# Patient Record
Sex: FEMALE
[De-identification: names, ages, dates, MRNs, and addresses within clinical notes are randomized per-mention and may not be internally consistent; named-entity substitution may affect disease eponyms.]

---

## 2018-06-28 ENCOUNTER — HOSPITAL ENCOUNTER (OUTPATIENT)
Dept: HOSPITAL 92 - SCSULT | Age: 38
Discharge: HOME | End: 2018-06-28
Attending: FAMILY MEDICINE
Payer: COMMERCIAL

## 2018-06-28 DIAGNOSIS — M79.605: Primary | ICD-10-CM

## 2018-06-28 NOTE — ULT
LEFT LOWER EXTREMITY VENOUS ULTRASOUND:

 

COMPARISON: 

None.

 

HISTORY: 

Left lower extremity pain for 3 days, particularly in the posterior thigh and popliteal region.

 

TECHNIQUE: 

Multiplanar, gray scale, and color Doppler images were obtained in a left lower extremity venous ultr
asound.  Spectral analysis of the Doppler waveforms was performed.

 

FINDINGS: 

The left common femoral vein, profunda femoral vein, superficial femoral vein, and popliteal vein are
 normal in appearance without visible thrombus.  These vessels demonstrate normal compression, flow, 
and augmentation.  The posterior tibial vein and greater saphenous vein are also patent.

 

IMPRESSION: 

No evidence of deep vein thrombosis.

 

POS: [facility]